# Patient Record
Sex: FEMALE | Race: WHITE | Employment: OTHER | URBAN - METROPOLITAN AREA
[De-identification: names, ages, dates, MRNs, and addresses within clinical notes are randomized per-mention and may not be internally consistent; named-entity substitution may affect disease eponyms.]

---

## 2021-12-13 ENCOUNTER — HOSPITAL ENCOUNTER (OUTPATIENT)
Dept: SURGERY | Age: 71
Discharge: HOME | End: 2021-12-13
Payer: COMMERCIAL

## 2021-12-13 VITALS — DIASTOLIC BLOOD PRESSURE: 75 MMHG | SYSTOLIC BLOOD PRESSURE: 171 MMHG

## 2021-12-13 PROCEDURE — 66821 AFTER CATARACT LASER SURGERY: CPT

## 2021-12-13 PROCEDURE — 6370000000 HC RX 637 (ALT 250 FOR IP): Performed by: OPHTHALMOLOGY

## 2021-12-13 RX ORDER — SODIUM CHLORIDE 0.9 % (FLUSH) 0.9 %
5-40 SYRINGE (ML) INJECTION PRN
Status: DISCONTINUED | OUTPATIENT
Start: 2021-12-13 | End: 2021-12-14 | Stop reason: HOSPADM

## 2021-12-13 RX ORDER — SODIUM CHLORIDE 0.9 % (FLUSH) 0.9 %
5-40 SYRINGE (ML) INJECTION EVERY 12 HOURS SCHEDULED
Status: DISCONTINUED | OUTPATIENT
Start: 2021-12-13 | End: 2021-12-14 | Stop reason: HOSPADM

## 2021-12-13 RX ORDER — PHENYLEPHRINE HCL 2.5 %
1 DROPS OPHTHALMIC (EYE) ONCE
Status: COMPLETED | OUTPATIENT
Start: 2021-12-13 | End: 2021-12-13

## 2021-12-13 RX ORDER — TROPICAMIDE 10 MG/ML
1 SOLUTION/ DROPS OPHTHALMIC ONCE
Status: COMPLETED | OUTPATIENT
Start: 2021-12-13 | End: 2021-12-13

## 2021-12-13 RX ORDER — SODIUM CHLORIDE 9 MG/ML
25 INJECTION, SOLUTION INTRAVENOUS PRN
Status: DISCONTINUED | OUTPATIENT
Start: 2021-12-13 | End: 2021-12-14 | Stop reason: HOSPADM

## 2021-12-13 RX ADMIN — TROPICAMIDE 1 DROP: 10 SOLUTION/ DROPS OPHTHALMIC at 10:26

## 2021-12-13 RX ADMIN — PHENYLEPHRINE HYDROCHLORIDE 1 DROP: 25 SOLUTION/ DROPS OPHTHALMIC at 10:26

## 2021-12-13 NOTE — OP NOTE
[place] [address]  [phone]      12/13/2021    Patient name: Kentrell Vaughn  YOB: 1950  MRN: [id number]    Allergies: Allergies   Allergen Reactions    Penicillins        Pre-operative diagnosis:  After cataract obscuring vision of the left eye. Post-operative diagnosis:  Same    Procedure Performed:  YAG Capsulotomy of the left eye    Anesthesia:  None    Estimated Blood Loss: None    Specimens removed: None    Complications:  None    Description of Procedure: A Yag Capsulotomy was performed today on the left eye. Pt appears to be oriented to time, place, and person. Pre-op medications instilled in the left eye: Proparacaine 1 drop, Jose Alberto-Synephrine 2.5% 1 drop topical and Mydriacyl 0.5% 1 drop topical. Patient is comfortable and will be released to self.      Electronically signed by: Med Arora MD,12/13/2021,11:27 AM

## 2021-12-13 NOTE — PROGRESS NOTES
Patient: Mike Cristina  1950, 71 y.o./female    Ambulatory to laser room. Left eye marked and numbed by Dr. Leena Cortez. Laser procedure done and tolerated well by patient. Post  instructions given to Jazlyn Sanders by provider.       Power setting was 3.1        # of shots was 23    Total power was 71.3 mJ    Katelynn Mckinney RN

## 2022-11-07 RX ORDER — MULTIVIT-MIN/IRON/FOLIC ACID/K 18-600-40
CAPSULE ORAL
COMMUNITY

## 2022-11-07 RX ORDER — FEXOFENADINE HYDROCHLORIDE 60 MG/1
60 TABLET, FILM COATED ORAL DAILY
COMMUNITY

## 2022-11-07 RX ORDER — PREDNISONE 10 MG/1
10 TABLET ORAL DAILY
COMMUNITY

## 2022-11-07 RX ORDER — ASPIRIN 81 MG/1
81 TABLET ORAL DAILY
COMMUNITY

## 2022-11-07 NOTE — PROGRESS NOTES
[address] [facility] time____0940________        Surgery time____1110________    Take the following medications with a sip of water: Follow your MD/Surgeons pre-procedure instructions regarding your medications     Do not eat or drink anything after 12:00 midnight prior to your surgery. This includes water chewing gum, mints and ice chips. You may brush your teeth and gargle the morning of your surgery, but do not swallow the water     Please see your family doctor/pediatrician for a history and physical and/or concerning medications. Bring any test results/reports from your physicians office. If you are under the care of a heart doctor or specialist doctor, please be aware that you may be asked to them for clearance    You may be asked to stop blood thinners such as Coumadin, Plavix, Fragmin, Lovenox, etc., or any anti-inflammatories such as:  Aspirin, Ibuprofen, Advil, Naproxen prior to your surgery. We also ask that you stop any OTC medications such as fish oil, vitamin E, glucosamine, garlic, Multivitamins, COQ 10, etc.    We ask that you do not smoke 24 hours prior to surgery  We ask that you do not  drink any alcoholic beverages 24 hours prior to surgery     You must make arrangements for a responsible adult to take you home after your surgery. For your safety you will not be allowed to leave alone or drive yourself home. Your surgery will be cancelled if you do not have a ride home. Also for your safety, it is strongly suggested that someone stay with you the first 24 hours after your surgery. A parent or legal guardian must accompany a child scheduled for surgery and plan to stay at the hospital until the child is discharged. Please do not bring other children with you. For your comfort, please wear simple loose fitting clothing to the hospital.  Please do not bring valuables.     Do not wear any make-up or nail polish on your fingers or toes      For your safety, please do not wear any jewelry or body piercing's on the day of surgery. All jewelry must be removed. If you have dentures, they will be removed before going to operating room. For your convenience, we will provide you with a container. If you wear contact lenses or glasses, they will be removed, please bring a case for them. If you have a living will and a durable power of  for healthcare, please bring in a copy. As part of our patient safety program to minimize surgical site infections, we ask you to do the following:    Please notify your surgeon if you develop any illness between         now and the  day of your surgery. This includes a cough, cold, fever, sore throat, nausea,         or vomiting, and diarrhea, etc.   Please notify your surgeon if you experience dizziness, shortness         of breath or blurred vision between now and the time of your surgery. Do not shave your operative site 96 hours prior to surgery. For face and neck surgery, men may use an electric razor 48 hours   prior to surgery. You may shower the night before surgery or the morning of   your surgery with an antibacterial soap. You will need to bring a photo ID and insurance card    [facility] has an onsite pharmacy, would you like to utilize our pharmacy     If you will be staying overnight and use a C-pap machine, please bring   your C-pap to hospital     Our goal is to provide you with excellent care, therefore, visitors will be limited to two(2) in the room at a time so that we may focus on providing this care for you. Please contact pre-admission testing if you have any further questions. [facility] phone number:  [phone]     [facility] PAT fax number:  [phone]  Please note these are generalized instructions for all surgical cases, you may be provided with more specific instructions according to your surgery.     C-Difficile admission screening and protocol:       * Admitted with diarrhea? [] YES    [x]  NO     *Prior history of C-Diff. In last 3 months? [] YES    [x]  NO     *Antibiotic use in the past 6-8 weeks? [x]  NO    []  YES                 If yes, which ANTIBIOTIC AND REASON______     *Prior hospitalization or nursing home in the last month? []  YES    []  NO        SAFETY FIRST. .call before you fall

## 2022-11-11 ENCOUNTER — ANESTHESIA EVENT (OUTPATIENT)
Dept: SURGERY | Age: 72
End: 2022-11-11
Payer: COMMERCIAL

## 2022-11-11 NOTE — PRE-PROCEDURE INSTRUCTIONS
[address] [facility]  [phone]        Pre-Op Phone Call:     Patient Name: Pop Kennedy     Telephone Information:   Mobile [phone]     Home phone:  [phone]    Surgery Time:   11:20 AM     Arrival Time:  0940     Left extended Message:  No     Message left with:     Recent change in health status:  No     Advised of transportation/ policy:  Yes     NPO policy reviewed: Yes     Advised to take morning heart/blood pressure medications with sips of water morning of surgery? Yes     Instructed to bring eye drops, photo identification, and insurance card day of surgery? Yes     Advised to wear short sleeved button down shirt (no T-shirt underneath):  Yes     Advised not to wear jewelry, hairpins, or pantyhose day of surgery? Yes     Advised not to wear make-up and to wash face day of surgery?   Yes    Remarks:        Electronically signed by:  Manav Ortega RN at 11/11/2022 10:37 AM

## 2022-11-14 ENCOUNTER — HOSPITAL ENCOUNTER (OUTPATIENT)
Age: 72
Setting detail: OUTPATIENT SURGERY
Discharge: HOME | End: 2022-11-14
Attending: OPHTHALMOLOGY | Admitting: OPHTHALMOLOGY
Payer: COMMERCIAL

## 2022-11-14 ENCOUNTER — ANESTHESIA (OUTPATIENT)
Dept: SURGERY | Age: 72
End: 2022-11-14
Payer: COMMERCIAL

## 2022-11-14 VITALS
RESPIRATION RATE: 14 BRPM | DIASTOLIC BLOOD PRESSURE: 71 MMHG | SYSTOLIC BLOOD PRESSURE: 149 MMHG | HEART RATE: 61 BPM | OXYGEN SATURATION: 100 % | HEIGHT: 62 IN | BODY MASS INDEX: 25.95 KG/M2 | WEIGHT: 141 LBS | TEMPERATURE: 97.4 F

## 2022-11-14 PROCEDURE — 2500000003 HC RX 250 WO HCPCS: Performed by: OPHTHALMOLOGY

## 2022-11-14 PROCEDURE — 3600000004 HC SURGERY LEVEL 4 BASE: Performed by: OPHTHALMOLOGY

## 2022-11-14 PROCEDURE — 2709999900 HC NON-CHARGEABLE SUPPLY: Performed by: OPHTHALMOLOGY

## 2022-11-14 PROCEDURE — 7100000010 HC PHASE II RECOVERY - FIRST 15 MIN: Performed by: OPHTHALMOLOGY

## 2022-11-14 PROCEDURE — 2580000003 HC RX 258: Performed by: ANESTHESIOLOGY

## 2022-11-14 PROCEDURE — 7100000011 HC PHASE II RECOVERY - ADDTL 15 MIN: Performed by: OPHTHALMOLOGY

## 2022-11-14 PROCEDURE — 6360000002 HC RX W HCPCS: Performed by: NURSE ANESTHETIST, CERTIFIED REGISTERED

## 2022-11-14 PROCEDURE — 3700000000 HC ANESTHESIA ATTENDED CARE: Performed by: OPHTHALMOLOGY

## 2022-11-14 PROCEDURE — 6370000000 HC RX 637 (ALT 250 FOR IP): Performed by: OPHTHALMOLOGY

## 2022-11-14 PROCEDURE — 2580000003 HC RX 258: Performed by: NURSE ANESTHETIST, CERTIFIED REGISTERED

## 2022-11-14 PROCEDURE — V2632 POST CHMBR INTRAOCULAR LENS: HCPCS | Performed by: OPHTHALMOLOGY

## 2022-11-14 DEVICE — LENS CLAREON IOL 22.5D: Type: IMPLANTABLE DEVICE | Site: EYE | Status: FUNCTIONAL

## 2022-11-14 RX ORDER — TETRACAINE HYDROCHLORIDE 5 MG/ML
SOLUTION OPHTHALMIC
Status: COMPLETED | OUTPATIENT
Start: 2022-11-14 | End: 2022-11-14

## 2022-11-14 RX ORDER — KETOROLAC TROMETHAMINE 5 MG/ML
1 SOLUTION OPHTHALMIC SEE ADMIN INSTRUCTIONS
Status: DISCONTINUED | OUTPATIENT
Start: 2022-11-14 | End: 2022-11-14 | Stop reason: HOSPADM

## 2022-11-14 RX ORDER — BALANCED SALT SOLUTION 6.4; .75; .48; .3; 3.9; 1.7 MG/ML; MG/ML; MG/ML; MG/ML; MG/ML; MG/ML
SOLUTION OPHTHALMIC
Status: COMPLETED | OUTPATIENT
Start: 2022-11-14 | End: 2022-11-14

## 2022-11-14 RX ORDER — CIPROFLOXACIN HYDROCHLORIDE 3.5 MG/ML
1 SOLUTION/ DROPS TOPICAL SEE ADMIN INSTRUCTIONS
Status: DISCONTINUED | OUTPATIENT
Start: 2022-11-14 | End: 2022-11-14 | Stop reason: HOSPADM

## 2022-11-14 RX ORDER — PHENYLEPHRINE HCL 2.5 %
1 DROPS OPHTHALMIC (EYE) SEE ADMIN INSTRUCTIONS
Status: DISCONTINUED | OUTPATIENT
Start: 2022-11-14 | End: 2022-11-14 | Stop reason: HOSPADM

## 2022-11-14 RX ORDER — MIDAZOLAM HYDROCHLORIDE 1 MG/ML
INJECTION INTRAMUSCULAR; INTRAVENOUS PRN
Status: DISCONTINUED | OUTPATIENT
Start: 2022-11-14 | End: 2022-11-14 | Stop reason: SDUPTHER

## 2022-11-14 RX ORDER — SODIUM CHLORIDE 9 MG/ML
INJECTION, SOLUTION INTRAVENOUS PRN
Status: DISCONTINUED | OUTPATIENT
Start: 2022-11-14 | End: 2022-11-14 | Stop reason: HOSPADM

## 2022-11-14 RX ORDER — SODIUM CHLORIDE 0.9 % (FLUSH) 0.9 %
5-40 SYRINGE (ML) INJECTION EVERY 12 HOURS SCHEDULED
Status: DISCONTINUED | OUTPATIENT
Start: 2022-11-14 | End: 2022-11-14 | Stop reason: HOSPADM

## 2022-11-14 RX ORDER — SODIUM CHLORIDE 0.9 % (FLUSH) 0.9 %
5-40 SYRINGE (ML) INJECTION PRN
Status: DISCONTINUED | OUTPATIENT
Start: 2022-11-14 | End: 2022-11-14 | Stop reason: HOSPADM

## 2022-11-14 RX ORDER — FENTANYL CITRATE 50 UG/ML
INJECTION, SOLUTION INTRAMUSCULAR; INTRAVENOUS PRN
Status: DISCONTINUED | OUTPATIENT
Start: 2022-11-14 | End: 2022-11-14 | Stop reason: SDUPTHER

## 2022-11-14 RX ORDER — TROPICAMIDE 10 MG/ML
1 SOLUTION/ DROPS OPHTHALMIC SEE ADMIN INSTRUCTIONS
Status: DISCONTINUED | OUTPATIENT
Start: 2022-11-14 | End: 2022-11-14 | Stop reason: HOSPADM

## 2022-11-14 RX ORDER — BRIMONIDINE TARTRATE 0.15 %
DROPS OPHTHALMIC (EYE)
Status: COMPLETED | OUTPATIENT
Start: 2022-11-14 | End: 2022-11-14

## 2022-11-14 RX ORDER — CYCLOPENTOLATE HYDROCHLORIDE 10 MG/ML
1 SOLUTION/ DROPS OPHTHALMIC SEE ADMIN INSTRUCTIONS
Status: DISCONTINUED | OUTPATIENT
Start: 2022-11-14 | End: 2022-11-14 | Stop reason: HOSPADM

## 2022-11-14 RX ORDER — SODIUM CHLORIDE 9 MG/ML
INJECTION, SOLUTION INTRAVENOUS CONTINUOUS PRN
Status: DISCONTINUED | OUTPATIENT
Start: 2022-11-14 | End: 2022-11-14 | Stop reason: SDUPTHER

## 2022-11-14 RX ORDER — TETRACAINE HYDROCHLORIDE 5 MG/ML
1 SOLUTION OPHTHALMIC ONCE
Status: COMPLETED | OUTPATIENT
Start: 2022-11-14 | End: 2022-11-14

## 2022-11-14 RX ADMIN — SODIUM CHLORIDE: 9 INJECTION, SOLUTION INTRAVENOUS at 10:57

## 2022-11-14 RX ADMIN — FENTANYL CITRATE 50 MCG: 50 INJECTION INTRAMUSCULAR; INTRAVENOUS at 11:37

## 2022-11-14 RX ADMIN — SODIUM CHLORIDE: 9 INJECTION, SOLUTION INTRAVENOUS at 11:37

## 2022-11-14 RX ADMIN — CYCLOPENTOLATE HYDROCHLORIDE 1 DROP: 10 SOLUTION OPHTHALMIC at 10:52

## 2022-11-14 RX ADMIN — PHENYLEPHRINE HYDROCHLORIDE 1 DROP: 25 SOLUTION/ DROPS OPHTHALMIC at 10:44

## 2022-11-14 RX ADMIN — MIDAZOLAM 1 MG: 1 INJECTION INTRAMUSCULAR; INTRAVENOUS at 11:37

## 2022-11-14 RX ADMIN — CYCLOPENTOLATE HYDROCHLORIDE 1 DROP: 10 SOLUTION OPHTHALMIC at 10:44

## 2022-11-14 RX ADMIN — TROPICAMIDE 1 DROP: 10 SOLUTION/ DROPS OPHTHALMIC at 10:44

## 2022-11-14 RX ADMIN — CIPROFLOXACIN 1 DROP: 3 SOLUTION OPHTHALMIC at 10:44

## 2022-11-14 RX ADMIN — CIPROFLOXACIN 1 DROP: 3 SOLUTION OPHTHALMIC at 10:52

## 2022-11-14 RX ADMIN — PHENYLEPHRINE HYDROCHLORIDE 1 DROP: 25 SOLUTION/ DROPS OPHTHALMIC at 10:52

## 2022-11-14 RX ADMIN — TETRACAINE HYDROCHLORIDE 1 DROP: 5 SOLUTION OPHTHALMIC at 10:44

## 2022-11-14 RX ADMIN — KETOROLAC TROMETHAMINE 1 DROP: 0.5 SOLUTION OPHTHALMIC at 10:52

## 2022-11-14 RX ADMIN — KETOROLAC TROMETHAMINE 1 DROP: 0.5 SOLUTION OPHTHALMIC at 10:44

## 2022-11-14 RX ADMIN — TROPICAMIDE 1 DROP: 10 SOLUTION/ DROPS OPHTHALMIC at 10:52

## 2022-11-14 ASSESSMENT — PAIN SCALES - GENERAL
PAINLEVEL_OUTOF10: 0
PAINLEVEL_OUTOF10: 0

## 2022-11-14 ASSESSMENT — PAIN - FUNCTIONAL ASSESSMENT: PAIN_FUNCTIONAL_ASSESSMENT: 0-10

## 2022-11-14 NOTE — H&P
Date of Surgery Update:  Mariah Wells was seen, history and physical examination reviewed, and patient examined by me today. There have been no significant clinical changes since the completion of the previous history and physical. The surgical site was confirmed by the patient and me. I have presented reasonable alternatives to the patient's proposed care, treatment, and services. The discussion I have done encompassed risks, benefits, and side effects related to the alternatives and the risks related to not receiving the proposed care, treatment, and services. All questions answered. Patient wishes to proceed.      Electronically signed by: Stuart Paez MD,11/14/2022,12:07 PM

## 2022-11-14 NOTE — OP NOTE
[place] [address]  [phone]      11/14/2022    Patient name: Jenna Salazar  YOB: 1950  MRN: [id number]    Allergies: Allergies   Allergen Reactions    Iodides     Penicillins        Pre-operative diagnosis:  Cataract Right Eye    Post-operative diagnosis:  Same    Procedure Performed:  Phacoemulsification with insertion of a foldable posterior chamber intraocular lens implant to the right eye. Anesthesia:  Topical Anesthesia with MAC. Estimated Blood Loss: None    Specimens removed: None    Limbal Relaxing Incisions:  Axis:N/A    Arc Length: N/A    Complications:  None    Description of Procedure: In the same day surgery center, the patient was given the usual pre-operative regimen. In the operating room suite, the right eye was prepped and draped in the usual sterile fashion. A paracentesis tract was fashioned, after which 0.5 MI of 1% preservative free Lidocaine was injected into the anterior chamber followed by Viscoat for a complete chamber fill. A clear cornea temporal tunnel was created using a keratome. Under optimal magnification and visualization, a continuous curvilinear capsulorrhexis was performed. Hydro-dissection of the lens was carried out using balanced salt solution. The lens was then phacoemulsified using the divide and conquer technique. Residual cortex was removed using the I/A handpiece followed by thorough posterior capsule polishing. The capsular bag was then filled with Provisc and the lens was gently delivered into the bag, achieving excellent centration. Provisc was removed using the I/A handpiece and the globe was pressurized using balanced salt solution. The paracentesis tract and the temporal wound were both checked and found to be watertight. The speculum was removed and Betadine 5% was instilled.  The patient tolerated the procedure well and was taken to the same day surgery suite in stable condition. Post-operative instructions and follow-up care were arranged.        Electronically signed by: Jean Arroela MD,11/14/2022,12:08 PM

## 2022-11-14 NOTE — ANESTHESIA POSTPROCEDURE EVALUATION
Department of Anesthesiology  Postprocedure Note    Patient: Mikki Khan  MRN: [id number]  YOB: 1950  Date of evaluation: 11/14/2022      Procedure Summary     Date: 11/14/22 Room / Location: [address]    Anesthesia Start: 1137 Anesthesia Stop: 1147    Procedure: PHACOEMULSIFICATION WITH INTRAOCULAR LENS IMPLANT - RIGHT (Right: Eye) Diagnosis:       Nuclear sclerotic cataract of right eye      (Nuclear sclerotic cataract of right eye)    Surgeons: Janis Tyler MD Responsible Provider: Monica Greer MD    Anesthesia Type: MAC ASA Status: 3          Anesthesia Type: MAC    Anibal Phase I: Anibal Score: 10    Anibal Phase II: Anibal Score: 10      Anesthesia Post Evaluation    Patient location during evaluation: PACU  Patient participation: complete - patient participated  Level of consciousness: awake and alert  Airway patency: patent  Nausea & Vomiting: no nausea and no vomiting  Complications: no  Cardiovascular status: hemodynamically stable and blood pressure returned to baseline  Respiratory status: spontaneous ventilation, nonlabored ventilation and room air  Hydration status: stable  Comments: Ms. Mel Choi was seen resting comfortably following procedure. Appropriate for discharge home with .

## 2022-11-14 NOTE — ANESTHESIA PRE PROCEDURE
Department of Anesthesiology  Preprocedure Note       Name:  Brionna Wright   Age:  67 y.o.  :  1950                                          MRN:  [id number]         Date:  2022      Surgeon: Martin Ball):  Daisy Martinez MD    Procedure: Procedure(s):  PHACOEMULSIFICATION WITH INTRAOCULAR LENS IMPLANT - RIGHT    Medications prior to admission:   Prior to Admission medications    Medication Sig Start Date End Date Taking?  Authorizing Provider   fexofenadine (ALLEGRA) 60 MG tablet Take 60 mg by mouth daily   Yes Historical Provider, MD   Cholecalciferol (VITAMIN D) 50 MCG (2000) CAPS capsule Take by mouth   Yes Historical Provider, MD   aspirin 81 MG EC tablet Take 81 mg by mouth daily   Yes Historical Provider, MD   predniSONE (DELTASONE) 10 MG tablet Take 10 mg by mouth daily   Yes Historical Provider, MD   Meth-Hyo-M Bl-Na Phos-Ph Sal (URIBEL PO) Take by mouth    Historical Provider, MD   PROAIR  (90 BASE) MCG/ACT inhaler  1/15/14   Historical Provider, MD       Current medications:    Current Facility-Administered Medications   Medication Dose Route Frequency Provider Last Rate Last Admin    sodium chloride flush 0.9 % injection 5-40 mL  5-40 mL IntraVENous 2 times per day Wilfrido Pérez MD        sodium chloride flush 0.9 % injection 5-40 mL  5-40 mL IntraVENous PRN Wilfrido Pérez MD        0.9 % sodium chloride infusion   IntraVENous PRN Wilfrido Pérez MD 25 mL/hr at 22 1057 New Bag at 22 1057    sodium chloride flush 0.9 % injection 5-40 mL  5-40 mL IntraVENous 2 times per day Speedy Valdovinos MD        sodium chloride flush 0.9 % injection 5-40 mL  5-40 mL IntraVENous PRN Speedy Valdovinos MD        0.9 % sodium chloride infusion   IntraVENous PRN Speedy Valdovinos MD        povidone-iodine 5 % ophthalmic solution   Right Eye Once Speedy Valdovinos MD        ciprofloxacin (CILOXAN) 0.3 % ophthalmic solution 1 drop  1 drop Right Eye See Admin Instructions Van Anderson MD   1 drop at 11/14/22 1052    lidocaine (AKTEN) 3.5 % ophthalmic gel   Right Eye See [address], MD        cyclopentolate (CYCLOGYL) 1 % ophthalmic solution 1 drop  1 drop Right Eye See Admin Instructions Speedy Goodman MD   1 drop at 11/14/22 1052    phenylephrine (MYDFRIN) 2.5 % ophthalmic solution 1 drop  1 drop Right Eye See Admin Instructions Speedy Goodman MD   1 drop at 11/14/22 1052    tropicamide (MYDRIACYL) 1 % ophthalmic solution 1 drop  1 drop Right Eye See Admin Instructions Speedy Goodman MD   1 drop at 11/14/22 1052    ketorolac (ACULAR) 0.5 % ophthalmic solution 1 drop  1 drop Right Eye See Admin Instructions Speedy Goodman MD   1 drop at 11/14/22 1052       Allergies: Allergies   Allergen Reactions    Iodides     Penicillins        Problem List:  There is no problem list on file for this patient. Past Medical History:        Diagnosis Date    Arterial stent thrombosis (HCC)     bilateral lower legs    Asthma     COPD (chronic obstructive pulmonary disease) (HCC)     Peripheral vascular disease (HCC)        Past Surgical History:        Procedure Laterality Date    APPENDECTOMY      COLONOSCOPY      CYST REMOVAL      EYE SURGERY Left     cataract    HYSTERECTOMY (CERVIX STATUS UNKNOWN)         Social History:    Social History     Tobacco Use    Smoking status: Former    Smokeless tobacco: Never   Substance Use Topics    Alcohol use:  No                                Counseling given: Not Answered      Vital Signs (Current):   Vitals:    11/07/22 0926 11/14/22 1050   BP:  (!) 159/61   Pulse:  91   Resp:  14   Temp:  97.3 °F (36.3 °C)   TempSrc:  Temporal   SpO2:  97%   Weight: 141 lb (64 kg) 141 lb (64 kg)   Height: 5' 2\" (1.575 m) 5' 2\" (1.575 m)                                              BP Readings from Last 3 Encounters:   11/14/22 (!) 159/61   12/13/21 (!) 171/75   06/25/15 136/80       NPO Status: Time of last liquid consumption: 0800                        Time of last solid consumption: 2200                        Date of last liquid consumption: 11/14/22                        Date of last solid food consumption: 11/13/22    BMI:   Wt Readings from Last 3 Encounters:   11/14/22 141 lb (64 kg)   06/25/15 156 lb (70.8 kg)   02/25/14 150 lb (68 kg)     Body mass index is 25.79 kg/m². CBC: No results found for: WBC, RBC, HGB, HCT, MCV, RDW, PLT    CMP: No results found for: NA, K, CL, CO2, BUN, CREATININE, GFRAA, AGRATIO, LABGLOM, GLUCOSE, GLU, PROT, CALCIUM, BILITOT, ALKPHOS, AST, ALT    POC Tests: No results for input(s): POCGLU, POCNA, POCK, POCCL, POCBUN, POCHEMO, POCHCT in the last 72 hours. Coags: No results found for: PROTIME, INR, APTT    HCG (If Applicable): No results found for: PREGTESTUR, PREGSERUM, HCG, HCGQUANT     ABGs: No results found for: PHART, PO2ART, LXF7OAZ, DGL0HOE, BEART, N0TTIKID     Type & Screen (If Applicable):  No results found for: LABABO, LABRH    Drug/Infectious Status (If Applicable):  No results found for: HIV, HEPCAB    COVID-19 Screening (If Applicable): No results found for: COVID19        Anesthesia Evaluation   no history of anesthetic complications:   Airway: Mallampati: I  TM distance: >3 FB     Mouth opening: > = 3 FB   Dental:    (+) upper dentures  Comment: Denies loose teeth    Pulmonary:   (+) COPD:  asthma:     (-) rhonchi, wheezes and rales  Smoker: former. Cardiovascular:  Exercise tolerance: no interval change,   (+) CAD:,     (-) orthopnea,  ARAUZ and peripheral edema      Rhythm: regular  Rate: normal                 ROS comment: No cardiac testing available.      Neuro/Psych:      (-) seizures, TIA and CVA           GI/Hepatic/Renal:        (-) liver disease, no renal disease and no morbid obesity       Endo/Other:        (-) diabetes mellitus, blood dyscrasia                ROS comment: Chronic corticosteroids: prednisone 10mg daily Abdominal:         (-) obese Abdomen: soft. Vascular:   + PVD, aortic or cerebral, DVT, .        ROS comment: s/p b/l LE stents. Other Findings: Very pleasant. Reports issues with vision following left phaco in past at [facility]. Improved after revision / laser treatment per Dr. Luis Angel Leon. Anesthesia Plan      MAC     ASA 3     (NPO appropriate. Ms. Cristiana Mcnulty denies active nausea / reflux.)        Anesthetic plan and risks discussed with patient. Plan discussed with CRNA. This pre-anesthesia assessment may be used as a history and physical.    DOS STAFF ADDENDUM:    Pt seen and examined, chart reviewed (including anesthesia, drug and allergy history). No interval changes to history and physical examination. Anesthetic plan, risks, benefits, alternatives, and personnel involved discussed with patient. Patient verbalized an understanding and agrees to proceed.       Contreras Flores MD  November 14, 2022  11:08 AM

## 2022-11-14 NOTE — DISCHARGE INSTRUCTIONS
[facility] [address]   [phone]       Post-Operative Instructions for Day of Cataract Surgery    2022      Patient Name: Mariah Wells  : 1950  MRN: [id number]    Use the Antibiotic Drop; one drop in the [address] more times today. Use the NSAID Drop ;one drop in the OPERATIVE EYE ONE more time today. Use the STEROID Drop: one drop in the OPERATIVE EYE THREE more times today. You may watch TV, walk, and do routine chores. Avoid heavy exertion or straining. Wear shield at bedtime for TWO nights. You may take Tylenol or Advil for mild irritation or pain. If you have pain worse than what Tylenol or Advil can manage, call your physician  If your next day appointment is in the afternoon, then use the Antibiotic, Anti-inflammatory, and Steroid drops once each that morning. Your next appointment is:                                   at:                                 location. Remember to bring your eye medications/kit to the office. General Post-Operative Instructions for Cataract Surgery    COMFORT - Your eye may feel irritated (scratchy, stinging, or achy) this is normal.   DIET - You may resume your regular diet, no alcohol for 24 hours. ACTIVITY - Do not lift anything over 25 pounds today, nothing over 50 pounds for the first week. No driving for 24 hours, Do not make any important decisions or sign legal documents for the first 24 hours. EYE CARE - Use your eye drops as instructed. Wash your hands before using your eye drops. Do not bump, rub, or touch the operative eye. No water in or around your eyes for 24 hours. You may shower from the shoulders down; You may wash your hair 24 hours following surgery. SHIELD - Wear the eye shield for 2 nights following surgery. VISION - You may experience off/on blurry vision today. Your vision will steadily improve over the next days.  Call your surgeon if you experience a drastic decrease in your vision. If your eyelid is closed from the anesthesia, or your eye is patched, your depth perception will be affected. Be careful when walking, especially with steps. When your eyelid opens, you may experience double vision until the anesthetic fully wears off. Bring your eye drops with you to each appointment! Further instructions will be reviewed with you at your follow up appointment. Any questions, please call the office at [phone] or call Dr. Abbe King directly at [phone] after hours.

## (undated) DEVICE — Z DISCONTINUED USE 2131664 WIPE INSTR W3XL3IN NONLINTING

## (undated) DEVICE — Device: Brand: MEDEX

## (undated) DEVICE — SOLUTION IRRIG 500ML STRL H2O NONPYROGENIC

## (undated) DEVICE — SYRINGE MED 30ML STD CLR PLAS LUERLOCK TIP N CTRL DISP

## (undated) DEVICE — 45° KELMAN®, 0.9 MM TURBOSONICS® MINI-FLARED ABS® TIP: Brand: ALCON, KELMAN, TURBOSONICS, MINI-FLARED ABS

## (undated) DEVICE — SOLUTION IRRIG BSS ST 500ML

## (undated) DEVICE — SYRINGE MED 3ML CLR PLAS STD N CTRL LUERLOCK TIP DISP

## (undated) DEVICE — SURGICAL PROCEDURE PACK PIK PPK374205] ALCON LABORATORIES INC]

## (undated) DEVICE — GLOVE SURG SZ 85 CRM LTX FREE POLYISOPRENE POLYMER BEAD ANTI

## (undated) DEVICE — SYRINGE TB 1ML NDL 25GA L0.625IN PLAS SLIP TIP CONVENTIONAL

## (undated) DEVICE — SURGICAL PROC PACK SHT WEST V4